# Patient Record
Sex: FEMALE | Race: WHITE | Employment: OTHER | URBAN - METROPOLITAN AREA
[De-identification: names, ages, dates, MRNs, and addresses within clinical notes are randomized per-mention and may not be internally consistent; named-entity substitution may affect disease eponyms.]

---

## 2017-06-05 ENCOUNTER — OFFICE VISIT (OUTPATIENT)
Dept: PHYSICAL MEDICINE AND REHAB | Facility: CLINIC | Age: 62
End: 2017-06-05

## 2017-06-05 VITALS
HEART RATE: 71 BPM | SYSTOLIC BLOOD PRESSURE: 138 MMHG | WEIGHT: 121.3 LBS | HEIGHT: 64 IN | DIASTOLIC BLOOD PRESSURE: 79 MMHG | BODY MASS INDEX: 20.71 KG/M2

## 2017-06-05 DIAGNOSIS — I63.89 CEREBROVASCULAR ACCIDENT (CVA) DUE TO OTHER MECHANISM (H): Primary | ICD-10-CM

## 2017-06-05 RX ORDER — BACLOFEN 10 MG/1
5 TABLET ORAL 3 TIMES DAILY
COMMUNITY
Start: 2017-05-04 | End: 2017-06-05

## 2017-06-05 RX ORDER — ATORVASTATIN CALCIUM 40 MG/1
40 TABLET, FILM COATED ORAL DAILY
COMMUNITY
Start: 2017-05-28

## 2017-06-05 RX ORDER — CLOPIDOGREL BISULFATE 75 MG/1
75 TABLET ORAL EVERY MORNING
COMMUNITY
Start: 2017-05-04

## 2017-06-05 RX ORDER — ACETAMINOPHEN 325 MG/1
650 TABLET ORAL EVERY 6 HOURS PRN
COMMUNITY
Start: 2017-01-13

## 2017-06-05 ASSESSMENT — PAIN SCALES - GENERAL: PAINLEVEL: NO PAIN (0)

## 2017-06-05 NOTE — MR AVS SNAPSHOT
After Visit Summary   6/5/2017    Karyn Ortiz    MRN: [id number]           Patient Information     Date Of Birth          1955        Visit Information        Provider Department      6/5/2017 8:30 AM Beck Villalobos PT M [facility]        Today's Diagnoses     Cerebrovascular accident (CVA) due to other mechanism (H)    -  1       Follow-ups after your visit        Your next 10 appointments already scheduled     Jun 07, 2017  8:30 AM CDT   (Arrive by 8:15 AM)   Return Visit with CIELO Hoyt [facility] ([facility])    [address] [address]   [phone]            Jun 08, 2017  8:30 AM CDT   (Arrive by 8:15 AM)   Return Visit with CIELO Hoyt [facility] ([facility])    [address] [address]   [phone]            Jun 09, 2017  8:30 AM CDT   (Arrive by 8:15 AM)   Return Visit with CIELO Hoyt [facility] ([facility])    [address] [place] [address]   [phone]            Jun 12, 2017  8:30 AM CDT   (Arrive by 8:15 AM)   Return Visit with CIELO Hoyt [facility] ([facility])    [address] [address]   [phone]            Jun 13, 2017  8:30 AM CDT   (Arrive by 8:15 AM)   Return Visit with CIELO Hoyt [facility] ([facility])    [address] [address]   [phone]            Jun 14, 2017  8:30 AM CDT   (Arrive by 8:15 AM)   [facility] ([facility])    [address] [address]   [phone]            Reagan 15, 2017  8:30 AM CDT   (Arrive by 8:15 AM)   Return Visit with Beck Villalobos PT   [facility] ([facility])    [address] [place] [id number] [phone]            2017  8:30 AM CDT   (Arrive by 8:15 AM)   Return Visit with CIELO Hoyt [facility] ([facility])    [address] [place] [id number] [phone]              Who to contact     Please call your clinic at [phone] to:    Ask questions about your health    Make or cancel appointments    Discuss your medicines    Learn about your test results    Speak to your doctor   If you have compliments or concerns about an experience at your clinic, or if you wish to file a complaint, please contact [facility] Patient Relations at [phone] or email us at [email or web address]         Additional Information About Your Visit        Physician Practice Revenue Solutionshart Information     CloudPassaget is an electronic gateway that provides easy, online access to your medical records. With Spock, you can request a clinic appointment, read your test results, renew a prescription or communicate with your care team.     To sign up for CloudPassaget visit the website at [email or web address]   You will be asked to enter the access code listed below, as well as some personal information. Please follow the directions to create your username and password.     Your access code is: [email or web address]  Expires: 2017  4:13 PM     Your access code will  in 90 days. If you need help or a new code, please contact your [facility] or call [phone] for assistance.        Care EveryWhere ID     This is your Care EveryWhere ID. This could be used by  "other organizations to access your [place] medical records  [id number]        Your Vitals Were     Pulse Height BMI (Body Mass Index)             71 1.626 m (5' 4\") 20.82 kg/m2          Blood Pressure from Last 3 Encounters:   06/06/17 127/75   06/05/17 138/79    Weight from Last 3 Encounters:   06/06/17 55 kg (121 lb 4.1 oz)   06/05/17 55 kg (121 lb 4.8 oz)              Today, you had the following     No orders found for display       Primary Care Provider Office Phone # Fax #    Gustavo Jason Gallo [phone]       [address]  [address]  [facility] [address]        Thank you!     Thank you for choosing [facility] PHYSICAL MEDICINE AND REHABILITATION  for your care. Our goal is always to provide you with excellent care. Hearing back from our patients is one way we can continue to improve our services. Please take a few minutes to complete the written survey that you may receive in the mail after your visit with us. Thank you!             Your Updated Medication List - Protect others around you: Learn how to safely use, store and throw away your medicines at www.disposemymeds.org.          This list is accurate as of: 6/5/17 11:59 PM.  Always use your most recent med list.                   Brand Name Dispense Instructions for use    acetaminophen 325 MG tablet    TYLENOL     Take 650 mg by mouth every 6 hours as needed       albuterol 108 (90 BASE) MCG/ACT Inhaler    PROAIR HFA/PROVENTIL HFA/VENTOLIN HFA         atorvastatin 40 MG tablet    LIPITOR     Take 40 mg by mouth daily       clopidogrel 75 MG tablet    PLAVIX     Take 75 mg by mouth every morning         "

## 2017-06-05 NOTE — LETTER
6/5/2017       RE: Karyn Ortiz  [address] [facility] [address]     Dear Colleague,    Thank you for referring your patient, Karyn Ortiz, to the [facility] PHYSICAL MEDICINE AND REHABILITATION at [facility]. Please see a copy of my visit note below.    61 year old female incurred MCA stroke in Dec of 2016.  Now left hemiparetic.  Good active finger motion on left.  Box and block score = 3 on left hand and 48 on right. Grasped and released 5 blocks on left in 25 s separate from BB test.   BDI = 5. No ipsilesional MEP at right M1.  RMT at left M1 = 58.   Treatment intensity = 52 at contralesional M1.  Received 10 minutes of 6 Hz priming followed by 10 minutes of 1 Hz principal rTMS continuous to contralesional M1.  Then hand exercises with grasp and release of small objects plus virtual reality ex.  Tolerated well.    Again, thank you for allowing me to participate in the care of your patient.      Sincerely,    Beck Villalobos, PT

## 2017-06-06 ENCOUNTER — OFFICE VISIT (OUTPATIENT)
Dept: PHYSICAL MEDICINE AND REHAB | Facility: CLINIC | Age: 62
End: 2017-06-06

## 2017-06-06 VITALS
WEIGHT: 121.25 LBS | HEIGHT: 64 IN | DIASTOLIC BLOOD PRESSURE: 75 MMHG | HEART RATE: 68 BPM | BODY MASS INDEX: 20.7 KG/M2 | SYSTOLIC BLOOD PRESSURE: 127 MMHG

## 2017-06-06 DIAGNOSIS — I63.89 CEREBROVASCULAR ACCIDENT (CVA) DUE TO OTHER MECHANISM (H): Primary | ICD-10-CM

## 2017-06-06 ASSESSMENT — PAIN SCALES - GENERAL: PAINLEVEL: NO PAIN (0)

## 2017-06-06 NOTE — MR AVS SNAPSHOT
After Visit Summary   6/6/2017    Karyn Ortiz    MRN: [id number]           Patient Information     Date Of Birth          1955        Visit Information        Provider Department      6/6/2017 8:30 AM Beck Villalobos PT M [facility]        Today's Diagnoses     Cerebrovascular accident (CVA) due to other mechanism (H)    -  1       Follow-ups after your visit        Your next 10 appointments already scheduled     Jun 07, 2017  8:30 AM CDT   (Arrive by 8:15 AM)   Return Visit with CIELO Hoyt [facility] ([facility])    [address] [address]   [phone]            Jun 08, 2017  8:30 AM CDT   (Arrive by 8:15 AM)   Return Visit with CIELO Hoyt [facility] ([facility])    [address] [place] [address]   [phone]            Jun 09, 2017  8:30 AM CDT   (Arrive by 8:15 AM)   Return Visit with CIELO Hoyt [facility] ([facility])    [address] [place] [address]   [phone]            Jun 12, 2017  8:30 AM CDT   (Arrive by 8:15 AM)   Return Visit with CIELO Hoyt [facility] ([facility])    [address] [address]   [phone]            Jun 13, 2017  8:30 AM CDT   (Arrive by 8:15 AM)   Return Visit with CIELO Hoyt [facility] ([facility])    [address] [address]   [phone]            Jun 14, 2017  8:30 AM CDT   (Arrive by 8:15 AM)   [facility] ([facility])    [address] [address]   [phone]            Reagan 15, 2017  8:30 AM CDT   (Arrive by 8:15 AM)   Return Visit with Beck Villalobos PT   [facility] Rehabilitation ([facility])    [address] [place] [address] [phone]            2017  8:30 AM CDT   (Arrive by 8:15 AM)   Return Visit with CIELO Hoyt [facility] Rehabilitation ([facility])    [address] [place] [id number] [phone]              Who to contact     Please call your clinic at [phone] to:    Ask questions about your health    Make or cancel appointments    Discuss your medicines    Learn about your test results    Speak to your doctor   If you have compliments or concerns about an experience at your clinic, or if you wish to file a complaint, please contact [facility] Patient Relations at [phone] or email us at [email or web address]         Additional Information About Your Visit        TheWraphart Information     Librelato Implementos RodoviÃ¡riost is an electronic gateway that provides easy, online access to your medical records. With Motally, you can request a clinic appointment, read your test results, renew a prescription or communicate with your care team.     To sign up for Librelato Implementos RodoviÃ¡riost visit the website at [email or web address]   You will be asked to enter the access code listed below, as well as some personal information. Please follow the directions to create your username and password.     Your access code is: [email or web address]  Expires: 2017  4:13 PM     Your access code will  in 90 days. If you need help or a new code, please contact your [facility] or call [phone] for assistance.        Care EveryWhere ID     This is your Care EveryWhere ID. This could be used by  "other organizations to access your [place] medical records  [id number]        Your Vitals Were     Pulse Height BMI (Body Mass Index)             68 1.626 m (5' 4\") 20.81 kg/m2          Blood Pressure from Last 3 Encounters:   06/06/17 127/75   06/05/17 138/79    Weight from Last 3 Encounters:   06/06/17 55 kg (121 lb 4.1 oz)   06/05/17 55 kg (121 lb 4.8 oz)              Today, you had the following     No orders found for display       Primary Care Provider Office Phone # Fax #    Gustavo Jason Gallo [phone]       [address]  [address]  [facility] [address]        Thank you!     Thank you for choosing [facility] PHYSICAL MEDICINE AND REHABILITATION  for your care. Our goal is always to provide you with excellent care. Hearing back from our patients is one way we can continue to improve our services. Please take a few minutes to complete the written survey that you may receive in the mail after your visit with us. Thank you!             Your Updated Medication List - Protect others around you: Learn how to safely use, store and throw away your medicines at www.disposemymeds.org.          This list is accurate as of: 6/6/17  1:24 PM.  Always use your most recent med list.                   Brand Name Dispense Instructions for use    acetaminophen 325 MG tablet    TYLENOL     Take 650 mg by mouth every 6 hours as needed       albuterol 108 (90 BASE) MCG/ACT Inhaler    PROAIR HFA/PROVENTIL HFA/VENTOLIN HFA         atorvastatin 40 MG tablet    LIPITOR     Take 40 mg by mouth daily       clopidogrel 75 MG tablet    PLAVIX     Take 75 mg by mouth every morning         "

## 2017-06-06 NOTE — PROGRESS NOTES
61 year old female incurred MCA stroke in Dec of 2016.  Now left hemiparetic.  Good active finger motion on left.  Box and block score = 3 on left hand and 48 on right. Grasped and released 5 blocks on left in 25 s separate from BB test.   BDI = 5. No ipsilesional MEP at right M1.  RMT at left M1 = 58.   Treatment intensity = 52 at contralesional M1.  Received 10 minutes of 6 Hz priming followed by 10 minutes of 1 Hz principal rTMS continuous to contralesional M1.  Then hand exercises with grasp and release of small objects plus virtual reality ex.  Tolerated well.

## 2017-06-06 NOTE — PROGRESS NOTES
No adverse events.  RMT at contralesional M1 = 53, Treatment intensity 48.  10 minutes of ^ Hz priming then 10 min of 1 Hz principal.  Then hand ex.

## 2017-06-06 NOTE — LETTER
6/6/2017       RE: Karyn Ortiz  [address] [facility] [address]     Dear Colleague,    Thank you for referring your patient, Karyn Ortiz, to the [facility] PHYSICAL MEDICINE AND REHABILITATION at [facility]. Please see a copy of my visit note below.    No adverse events.  RMT at contralesional M1 = 53, Treatment intensity 48.  10 minutes of ^ Hz priming then 10 min of 1 Hz principal.  Then hand ex.    Again, thank you for allowing me to participate in the care of your patient.      Sincerely,    Beck Villalobos, PT

## 2017-06-07 ENCOUNTER — OFFICE VISIT (OUTPATIENT)
Dept: PHYSICAL MEDICINE AND REHAB | Facility: CLINIC | Age: 62
End: 2017-06-07

## 2017-06-07 VITALS
HEART RATE: 74 BPM | SYSTOLIC BLOOD PRESSURE: 129 MMHG | HEIGHT: 64 IN | DIASTOLIC BLOOD PRESSURE: 78 MMHG | WEIGHT: 121.25 LBS | BODY MASS INDEX: 20.7 KG/M2

## 2017-06-07 DIAGNOSIS — I63.89 CEREBROVASCULAR ACCIDENT (CVA) DUE TO OTHER MECHANISM (H): Primary | ICD-10-CM

## 2017-06-07 ASSESSMENT — PAIN SCALES - GENERAL: PAINLEVEL: NO PAIN (0)

## 2017-06-07 NOTE — LETTER
6/7/2017       RE: Karyn Ortiz  [address] [facility] [address]     Dear Colleague,    Thank you for referring your patient, Karyn Ortiz, to the [facility] PHYSICAL MEDICINE AND REHABILITATION at [facility]. Please see a copy of my visit note below.    No adverse events.   RMT =50 at contralesional M1.  Treatment intenstiy = 45.  10 minutes of 6 Hz priming, then 10 minutes of 1 Hz principal.  Then virtual reality hand ex, which patient was motivated by.  Then physical tasks with hand.  Improvement noted in ability to raise a bottle of water to mouth and drink with left hand.  Still has much difficulty pinching small objects because of gross activation of all fingers and little thumb opposition.      Again, thank you for allowing me to participate in the care of your patient.      Sincerely,    Beck Villalobos, PT

## 2017-06-07 NOTE — PROGRESS NOTES
No adverse events.   RMT =50 at contralesional M1.  Treatment intenstiy = 45.  10 minutes of 6 Hz priming, then 10 minutes of 1 Hz principal.  Then virtual reality hand ex, which patient was motivated by.  Then physical tasks with hand.  Improvement noted in ability to raise a bottle of water to mouth and drink with left hand.  Still has much difficulty pinching small objects because of gross activation of all fingers and little thumb opposition.

## 2017-06-07 NOTE — MR AVS SNAPSHOT
After Visit Summary   6/7/2017    Karyn Ortiz    MRN: [id number]           Patient Information     Date Of Birth          1955        Visit Information        Provider Department      6/7/2017 8:30 AM Beck Villalobos PT M [facility]        Today's Diagnoses     Cerebrovascular accident (CVA) due to other mechanism (H)    -  1       Follow-ups after your visit        Your next 10 appointments already scheduled     Jun 08, 2017  8:30 AM CDT   (Arrive by 8:15 AM)   Return Visit with CIELO Hoyt [facility] ([facility])    [address] [address]   [phone]            Jun 09, 2017  8:30 AM CDT   (Arrive by 8:15 AM)   Return Visit with CIELO Hoyt [facility] ([facility])    [address] [address]   [phone]            Jun 12, 2017  8:30 AM CDT   (Arrive by 8:15 AM)   Return Visit with CIELO Hoyt [facility] ([facility])    [address] [address]   [phone]            Jun 13, 2017  8:30 AM CDT   (Arrive by 8:15 AM)   Return Visit with CIELO Hoyt [facility] ([facility])    [address] [address]   [phone]            Jun 14, 2017  8:30 AM CDT   (Arrive by 8:15 AM)   Return Visit with CIELO Hoyt [facility] ([facility])    [address] [address]   [phone]            Reagan 15, 2017  8:30 AM CDT   (Arrive by 8:15 AM)   [facility] ([facility])    [address] [facility]  [address] [facility] [address] [phone]            2017  8:30 AM CDT   (Arrive by 8:15 AM)   Return Visit with Beck Villalobos PT   [facility] Physical Medicine and Rehabilitation ([facility])    [address] [phone] [phone]              Who to contact     Please call your clinic at [phone] to:    Ask questions about your health    Make or cancel appointments    Discuss your medicines    Learn about your test results    Speak to your doctor   If you have compliments or concerns about an experience at your clinic, or if you wish to file a complaint, please contact [facility] Patient Relations at [phone] or email us at [email or web address]         Additional Information About Your Visit        MyChart Information     We R Interactivet is an electronic gateway that provides easy, online access to your medical records. With Basho Technologies, you can request a clinic appointment, read your test results, renew a prescription or communicate with your care team.     To sign up for Basho Technologies visit the website at [email or web address]   You will be asked to enter the access code listed below, as well as some personal information. Please follow the directions to create your username and password.     Your access code is: [email or web address]  Expires: 2017  4:13 PM     Your access code will  in 90 days. If you need help or a new code, please contact your [facility] or call [phone] for assistance.        Care EveryWhere ID     This is your Care EveryWhere ID. This could be used by other organizations to access your [place] medical records  [id number]        Your Vitals Were     Pulse Height BMI (Body Mass Index)             74 1.626 m (5' 4\") 20.81 kg/m2          Blood Pressure from Last 3 Encounters:   17 129/78   17 127/75 "   06/05/17 138/79    Weight from Last 3 Encounters:   06/07/17 55 kg (121 lb 4.1 oz)   06/06/17 55 kg (121 lb 4.1 oz)   06/05/17 55 kg (121 lb 4.8 oz)              Today, you had the following     No orders found for display       Primary Care Provider Office Phone # Fax #    Gustavo Gallo [phone]       [address] [place] [address]        Thank you!     Thank you for choosing [facility] PHYSICAL MEDICINE AND REHABILITATION  for your care. Our goal is always to provide you with excellent care. Hearing back from our patients is one way we can continue to improve our services. Please take a few minutes to complete the written survey that you may receive in the mail after your visit with us. Thank you!             Your Updated Medication List - Protect others around you: Learn how to safely use, store and throw away your medicines at www.disposemymeds.org.          This list is accurate as of: 6/7/17  1:19 PM.  Always use your most recent med list.                   Brand Name Dispense Instructions for use    acetaminophen 325 MG tablet    TYLENOL     Take 650 mg by mouth every 6 hours as needed       albuterol 108 (90 BASE) MCG/ACT Inhaler    PROAIR HFA/PROVENTIL HFA/VENTOLIN HFA         atorvastatin 40 MG tablet    LIPITOR     Take 40 mg by mouth daily       clopidogrel 75 MG tablet    PLAVIX     Take 75 mg by mouth every morning

## 2017-06-08 ENCOUNTER — OFFICE VISIT (OUTPATIENT)
Dept: PHYSICAL MEDICINE AND REHAB | Facility: CLINIC | Age: 62
End: 2017-06-08

## 2017-06-08 VITALS
HEIGHT: 64 IN | BODY MASS INDEX: 20.7 KG/M2 | DIASTOLIC BLOOD PRESSURE: 73 MMHG | SYSTOLIC BLOOD PRESSURE: 126 MMHG | RESPIRATION RATE: 18 BRPM | WEIGHT: 121.25 LBS | HEART RATE: 76 BPM

## 2017-06-08 DIAGNOSIS — I63.89 CEREBROVASCULAR ACCIDENT (CVA) DUE TO OTHER MECHANISM (H): Primary | ICD-10-CM

## 2017-06-08 ASSESSMENT — PAIN SCALES - GENERAL: PAINLEVEL: NO PAIN (0)

## 2017-06-08 NOTE — PROGRESS NOTES
No adverse events. RMT at contralesional M1=55.  Treatment intensity=50 to contalesional M1.  Received 10 minutes of priming then 10 minutes of principal rTMS.  Followed by hand ex.   Tried tracking ex but this was difficult at first.  Would be good to try in a few days.  Greatest functional problem is that she cannot flex and extend her fingers individually.  Rather, it is a mass flexion and extension, which is fine for large objects the size of a potato but very difficult for objects the size of a grape.  Botox should be considered to hopefully reduce this mass flexion but leave enough neuromuscular action intact to allow less forceful individual finger movements.

## 2017-06-08 NOTE — MR AVS SNAPSHOT
After Visit Summary   6/8/2017    Karyn Ortiz    MRN: [id number]           Patient Information     Date Of Birth          1955        Visit Information        Provider Department      6/8/2017 8:30 AM Beck Villalobso PT M [facility]        Today's Diagnoses     Cerebrovascular accident (CVA) due to other mechanism (H)    -  1       Follow-ups after your visit        Your next 10 appointments already scheduled     Jun 09, 2017  8:30 AM CDT   (Arrive by 8:15 AM)   Return Visit with CIELO Hoyt [facility] ([facility])    [address] [address]   [phone]            Jun 12, 2017  8:30 AM CDT   (Arrive by 8:15 AM)   Return Visit with CIELO Hoyt [facility] ([facility])    [address] [address]   [phone]            Jun 13, 2017  8:30 AM CDT   (Arrive by 8:15 AM)   Return Visit with CIELO Hoyt [facility] ([facility])    [address] [address]   [phone]            Jun 14, 2017  8:30 AM CDT   (Arrive by 8:15 AM)   Return Visit with ICELO Hoyt [facility] ([facility])    [address] [address]   [phone]            Reagan 15, 2017  8:30 AM CDT   (Arrive by 8:15 AM)   Return Visit with CIELO Hoyt [facility] ([facility])    [address] [address]   [phone]            Jun 16, 2017  8:30 AM CDT   (Arrive by 8:15 AM)   [facility]  [facility])    [address] [address] [phone]              Who to contact     Please call your clinic at [phone] to:    Ask questions about your health    Make or cancel appointments    Discuss your medicines    Learn about your test results    Speak to your doctor   If you have compliments or concerns about an experience at your clinic, or if you wish to file a complaint, please contact [facility] Patient Relations at [phone] or email us at [email or web address]         Additional Information About Your Visit        Simmr Information     Simmr is an electronic gateway that provides easy, online access to your medical records. With Simmr, you can request a clinic appointment, read your test results, renew a prescription or communicate with your care team.     To sign up for Simmr visit the website at [email or web address]   You will be asked to enter the access code listed below, as well as some personal information. Please follow the directions to create your username and password.     Your access code is: [email or web address]  Expires: 2017  4:13 PM     Your access code will  in 90 days. If you need help or a new code, please contact your [facility] or call [phone] for assistance.        Care EveryWhere ID     This is your Care EveryWhere ID. This could be used by other organizations to access your [place] medical records  [id number]        Your Vitals Were     Pulse Respirations Height BMI (Body Mass Index)          76 18 1.626 m (5' 4\") 20.81 kg/m2         Blood Pressure from Last 3 Encounters:   17 126/73   17 129/78   17 127/75    Weight from Last 3 Encounters:   17 55 kg (121 lb 4.1 oz)   17 55 kg (121 lb 4.1 oz)   17 55 kg (121 lb 4.1 oz)              Today, you had the following     No orders found for display       Primary Care " Provider Office Phone # Fax #    Gustavo Gallo [phone]       [place] [address] [place] [address]        Thank you!     Thank you for choosing [facility] PHYSICAL MEDICINE AND REHABILITATION  for your care. Our goal is always to provide you with excellent care. Hearing back from our patients is one way we can continue to improve our services. Please take a few minutes to complete the written survey that you may receive in the mail after your visit with us. Thank you!             Your Updated Medication List - Protect others around you: Learn how to safely use, store and throw away your medicines at www.disposemymeds.org.          This list is accurate as of: 6/8/17 11:42 AM.  Always use your most recent med list.                   Brand Name Dispense Instructions for use    acetaminophen 325 MG tablet    TYLENOL     Take 650 mg by mouth every 6 hours as needed       albuterol 108 (90 BASE) MCG/ACT Inhaler    PROAIR HFA/PROVENTIL HFA/VENTOLIN HFA         atorvastatin 40 MG tablet    LIPITOR     Take 40 mg by mouth daily       clopidogrel 75 MG tablet    PLAVIX     Take 75 mg by mouth every morning

## 2017-06-08 NOTE — LETTER
6/8/2017       RE: Karyn Ortiz  [address] [facility] [address]     Dear Colleague,    Thank you for referring your patient, Karyn Ortiz, to the [facility]ICAL MEDICINE AND REHABILITATION at [facility]. Please see a copy of my visit note below.    No adverse events. RMT at contralesional M1=55.  Treatment intensity=50 to contalesional M1.  Received 10 minutes of priming then 10 minutes of principal rTMS.  Followed by hand ex.   Tried tracking ex but this was difficult at first.  Would be good to try in a few days.  Greatest functional problem is that she cannot flex and extend her fingers individually.  Rather, it is a mass flexion and extension, which is fine for large objects the size of a potato but very difficult for objects the size of a grape.  Botox should be considered to hopefully reduce this mass flexion but leave enough neuromuscular action intact to allow less forceful individual finger movements.    Again, thank you for allowing me to participate in the care of your patient.      Sincerely,    Beck Villalobos, PT

## 2017-06-09 ENCOUNTER — OFFICE VISIT (OUTPATIENT)
Dept: PHYSICAL MEDICINE AND REHAB | Facility: CLINIC | Age: 62
End: 2017-06-09

## 2017-06-09 VITALS
HEART RATE: 68 BPM | HEIGHT: 64 IN | SYSTOLIC BLOOD PRESSURE: 126 MMHG | WEIGHT: 121.25 LBS | BODY MASS INDEX: 20.7 KG/M2 | DIASTOLIC BLOOD PRESSURE: 71 MMHG

## 2017-06-09 DIAGNOSIS — I63.89 CEREBROVASCULAR ACCIDENT (CVA) DUE TO OTHER MECHANISM (H): Primary | ICD-10-CM

## 2017-06-09 ASSESSMENT — PAIN SCALES - GENERAL: PAINLEVEL: NO PAIN (0)

## 2017-06-09 NOTE — PROGRESS NOTES
No adverse events.  RMT for contralesional M1=50, treatment intensity kept at 50.  10 priming rTMS, 10 minutes principal rTMS.  Hand exercises.  Noticed improved drinking from water bottle and release of water bottle.  Pinch and release of small objects improving slowly.  Individual control of fingers still difficult.

## 2017-06-09 NOTE — MR AVS SNAPSHOT
After Visit Summary   6/9/2017    Karyn Ortiz    MRN: [id number]           Patient Information     Date Of Birth          1955        Visit Information        Provider Department      6/9/2017 8:30 AM Beck Villalobos PT M [facility]        Today's Diagnoses     Cerebrovascular accident (CVA) due to other mechanism (H)    -  1       Follow-ups after your visit        Your next 10 appointments already scheduled     Jun 12, 2017  8:30 AM CDT   (Arrive by 8:15 AM)   Return Visit with CIELO Hoyt [facility] ([facility])    [address] [address] [phone]            Jun 13, 2017  8:30 AM CDT   (Arrive by 8:15 AM)   Return Visit with CIELO Hoyt [facility] ([facility])    [address] [address] [phone]            Jun 14, 2017  8:30 AM CDT   (Arrive by 8:15 AM)   Return Visit with CIELO Hoyt [facility] ([facility])    [address] [address] [phone]            Reagan 15, 2017  8:30 AM CDT   (Arrive by 8:15 AM)   Return Visit with CIELO Hoyt [facility] ([facility])    [address] [address] [phone]            Jun 16, 2017  8:30 AM CDT   (Arrive by 8:15 AM)   Return Visit with CIELO Hoyt [facility] ([facility])    [address] [address] [phone]              Who to contact     Please call your clinic at [phone] to:    Ask questions about your health    Make or cancel appointments    Discuss your medicines    Learn  "about your test results    Speak to your doctor   If you have compliments or concerns about an experience at your clinic, or if you wish to file a complaint, please contact [facility] Patient Relations at [phone] or email us at [email or web address]         Additional Information About Your Visit        [email or web address] Information     Genwords is an electronic gateway that provides easy, online access to your medical records. With Genwords, you can request a clinic appointment, read your test results, renew a prescription or communicate with your care team.     To sign up for Genwords visit the website at [email or web address]   You will be asked to enter the access code listed below, as well as some personal information. Please follow the directions to create your username and password.     Your access code is: [email or web address]  Expires: 2017  4:13 PM     Your access code will  in 90 days. If you need help or a new code, please contact your [facility] or call [phone] for assistance.        Care EveryWhere ID     This is your Care EveryWhere ID. This could be used by other organizations to access your [place] medical records  [id number]        Your Vitals Were     Pulse Height BMI (Body Mass Index)             68 1.626 m (5' 4\") 20.81 kg/m2          Blood Pressure from Last 3 Encounters:   17 126/71   17 126/73   17 129/78    Weight from Last 3 Encounters:   17 55 kg (121 lb 4.1 oz)   17 55 kg (121 lb 4.1 oz)   17 55 kg (121 lb 4.1 oz)              Today, you had the following     No orders found for display       Primary Care Provider Office Phone # Fax #    Gustavo Gallo [phone]       [address] [place] [address]        Thank you!     Thank you for choosing [facility] PHYSICAL MEDICINE AND REHABILITATION  for your care. Our goal is always " to provide you with excellent care. Hearing back from our patients is one way we can continue to improve our services. Please take a few minutes to complete the written survey that you may receive in the mail after your visit with us. Thank you!             Your Updated Medication List - Protect others around you: Learn how to safely use, store and throw away your medicines at www.disposemymeds.org.          This list is accurate as of: 6/9/17 11:45 AM.  Always use your most recent med list.                   Brand Name Dispense Instructions for use    acetaminophen 325 MG tablet    TYLENOL     Take 650 mg by mouth every 6 hours as needed       albuterol 108 (90 BASE) MCG/ACT Inhaler    PROAIR HFA/PROVENTIL HFA/VENTOLIN HFA         atorvastatin 40 MG tablet    LIPITOR     Take 40 mg by mouth daily       clopidogrel 75 MG tablet    PLAVIX     Take 75 mg by mouth every morning

## 2017-06-09 NOTE — LETTER
6/9/2017       RE: Karyn Ortiz  [address] [facility] [address]     Dear Colleague,    Thank you for referring your patient, Karyn Ortiz, to the [facility] PHYSICAL MEDICINE AND REHABILITATION at [facility]. Please see a copy of my visit note below.    No adverse events.  RMT for contralesional M1=50, treatment intensity kept at 50.  10 priming rTMS, 10 minutes principal rTMS.  Hand exercises.  Noticed improved drinking from water bottle and release of water bottle.  Pinch and release of small objects improving slowly.  Individual control of fingers still difficult.    Again, thank you for allowing me to participate in the care of your patient.      Sincerely,    Beck Villalobos, PT

## 2017-06-12 ENCOUNTER — OFFICE VISIT (OUTPATIENT)
Dept: PHYSICAL MEDICINE AND REHAB | Facility: CLINIC | Age: 62
End: 2017-06-12

## 2017-06-12 VITALS
SYSTOLIC BLOOD PRESSURE: 133 MMHG | DIASTOLIC BLOOD PRESSURE: 69 MMHG | HEART RATE: 68 BPM | HEIGHT: 64 IN | WEIGHT: 121.25 LBS | BODY MASS INDEX: 20.7 KG/M2

## 2017-06-12 DIAGNOSIS — I63.89 CEREBROVASCULAR ACCIDENT (CVA) DUE TO OTHER MECHANISM (H): Primary | ICD-10-CM

## 2017-06-12 ASSESSMENT — PAIN SCALES - GENERAL: PAINLEVEL: NO PAIN (0)

## 2017-06-12 NOTE — PROGRESS NOTES
No adverse events.  Explored whether an ipsilesional MEP could be elicited today.  Indeed, she did now demonstrate such a response, whereas she did not at baseline.  RMT was 90.  Good sign.  Continued with ipsilesional rTMS.  RMT for contralesional M1 = 55.  Treatment intensity =50.  Followed by variety of finger exercises and electrical stimulation to thenar eminence to promote more thumb opposition.

## 2017-06-12 NOTE — MR AVS SNAPSHOT
After Visit Summary   6/12/2017    Karyn Ortiz    MRN: [id number]           Patient Information     Date Of Birth          1955        Visit Information        Provider Department      6/12/2017 8:30 AM Beck Villalobos PT M [facility]        Today's Diagnoses     Cerebrovascular accident (CVA) due to other mechanism (H)    -  1       Follow-ups after your visit        Your next 10 appointments already scheduled     Jun 13, 2017  8:30 AM CDT   (Arrive by 8:15 AM)   Return Visit with CIELO Hoyt [facility] ([facility])    [address] [place] [address] [phone]            Jun 14, 2017  8:30 AM CDT   (Arrive by 8:15 AM)   Return Visit with CIELO Hoyt [facility] ([facility])    [address] [place] [id number] [phone]            Reagan 15, 2017  8:30 AM CDT   (Arrive by 8:15 AM)   Return Visit with CIELO Hoyt [facility] ([facility])    [address] [place] [id number] [phone]            Jun 16, 2017  8:30 AM CDT   (Arrive by 8:15 AM)   Return Visit with CIELO Hoyt [facility] ([facility])    [address] [place] [address] [phone]              Who to contact     Please call your clinic at [phone] to:    Ask questions about your health    Make or cancel appointments    Discuss your medicines    Learn about your test results    Speak to your doctor   If you have compliments or concerns about an experience at your clinic, or if you wish to file a complaint, please contact [facility] Patient Relations at [phone] or email us at  "[email or web address]         Additional Information About Your Visit        inFreeDAharPipewise Information     MyLife is an electronic gateway that provides easy, online access to your medical records. With MyLife, you can request a clinic appointment, read your test results, renew a prescription or communicate with your care team.     To sign up for MyLife visit the website at [email or web address] Fund [email or web address]   You will be asked to enter the access code listed below, as well as some personal information. Please follow the directions to create your username and password.     Your access code is: [email or web address]  Expires: 2017  4:13 PM     Your access code will  in 90 days. If you need help or a new code, please contact your [facility] or call [phone] for assistance.        Care EveryWhere ID     This is your Care EveryWhere ID. This could be used by other organizations to access your [place] medical records  [id number]        Your Vitals Were     Pulse Height BMI (Body Mass Index)             68 1.626 m (5' 4\") 20.81 kg/m2          Blood Pressure from Last 3 Encounters:   17 133/69   17 126/71   17 126/73    Weight from Last 3 Encounters:   17 55 kg (121 lb 4.1 oz)   17 55 kg (121 lb 4.1 oz)   17 55 kg (121 lb 4.1 oz)              Today, you had the following     No orders found for display       Primary Care Provider Office Phone # Fax #    Gustavo Gallo [phone]       [address]  [employer] [address]        Thank you!     Thank you for choosing [facility] PHYSICAL MEDICINE AND REHABILITATION  for your care. Our goal is always to provide you with excellent care. Hearing back from our patients is one way we can continue to improve our services. Please take a few minutes to complete the written survey that you may receive in the mail after your visit with us. Thank you!           "   Your Updated Medication List - Protect others around you: Learn how to safely use, store and throw away your medicines at www.disposemymeds.org.          This list is accurate as of: 6/12/17  6:07 PM.  Always use your most recent med list.                   Brand Name Dispense Instructions for use    acetaminophen 325 MG tablet    TYLENOL     Take 650 mg by mouth every 6 hours as needed       albuterol 108 (90 BASE) MCG/ACT Inhaler    PROAIR HFA/PROVENTIL HFA/VENTOLIN HFA         atorvastatin 40 MG tablet    LIPITOR     Take 40 mg by mouth daily       clopidogrel 75 MG tablet    PLAVIX     Take 75 mg by mouth every morning

## 2017-06-12 NOTE — LETTER
6/12/2017       RE: Karyn Ortiz  [address] [facility] [address]     Dear Colleague,    Thank you for referring your patient, Karyn Ortiz, to the [facility] PHYSICAL MEDICINE AND REHABILITATION at [facility]. Please see a copy of my visit note below.    No adverse events.  Explored whether an ipsilesional MEP could be elicited today.  Indeed, she did now demonstrate such a response, whereas she did not at baseline.  RMT was 90.  Good sign.  Continued with ipsilesional rTMS.  RMT for contralesional M1 = 55.  Treatment intensity =50.  Followed by variety of finger exercises and electrical stimulation to thenar eminence to promote more thumb opposition.    Again, thank you for allowing me to participate in the care of your patient.      Sincerely,    Beck Villalobos, PT

## 2017-06-13 ENCOUNTER — OFFICE VISIT (OUTPATIENT)
Dept: PHYSICAL MEDICINE AND REHAB | Facility: CLINIC | Age: 62
End: 2017-06-13

## 2017-06-13 VITALS
HEIGHT: 64 IN | WEIGHT: 121.25 LBS | HEART RATE: 77 BPM | DIASTOLIC BLOOD PRESSURE: 77 MMHG | SYSTOLIC BLOOD PRESSURE: 136 MMHG | BODY MASS INDEX: 20.7 KG/M2

## 2017-06-13 DIAGNOSIS — I63.89 CEREBROVASCULAR ACCIDENT (CVA) DUE TO OTHER MECHANISM (H): Primary | ICD-10-CM

## 2017-06-13 ASSESSMENT — PAIN SCALES - GENERAL: PAINLEVEL: NO PAIN (0)

## 2017-06-13 NOTE — LETTER
6/13/2017     RE: Karyn Ortiz  [address] [facility] [address]     Dear Colleague,    Thank you for referring your patient, Karyn Ortiz, to the [facility] PHYSICAL MEDICINE AND REHABILITATION at [facility]. Please see a copy of my visit note below.    No adverse events.  Patient reports using left hand more at home, including turning lights on and off at wall switch. RMT at contralesional M1=54 today.  Treatment intensity was also 54 (i.e. 100% of RMT).  10 min of 6 Hz priming then 10 min of 1 Hz rTMS to contralesional M1.  Continued with various hand ex.  Stiffness is increasing in wrist flexors and elbow flexors and shoulder internal rotators.  Will receive Botox on July 3.    Again, thank you for allowing me to participate in the care of your patient.      Sincerely,    Beck Villalobos, PT

## 2017-06-13 NOTE — MR AVS SNAPSHOT
After Visit Summary   6/13/2017    Karyn Ortiz    MRN: [id number]           Patient Information     Date Of Birth          1955        Visit Information        Provider Department      6/13/2017 8:30 AM Beck Villalobos PT M [facility] Medicine [facility] Rehabilitation        Today's Diagnoses     Cerebrovascular accident (CVA) due to other mechanism (H)    -  1       Follow-ups after your visit        Your next 10 appointments already scheduled     Jun 14, 2017  8:30 AM CDT   (Arrive by 8:15 AM)   Return Visit with CIELO Hoyt [facility] Rehabilitation ([facility])    [address] [place] [address] [phone]            Reagan 15, 2017  8:30 AM CDT   (Arrive by 8:15 AM)   Return Visit with CIELO Hoyt [facility] ([facility])    [address] [place] [id number] [phone]            Jun 16, 2017  8:30 AM CDT   (Arrive by 8:15 AM)   Return Visit with CIELO Hoyt [facility] ([facility])    [address] [place] [address] [phone]              Who to contact     Please call your clinic at [phone] to:    Ask questions about your health    Make or cancel appointments    Discuss your medicines    Learn about your test results    Speak to your doctor   If you have compliments or concerns about an experience at your clinic, or if you wish to file a complaint, please contact [facility] Patient Relations at [phone] or email us at [email or web address]         Additional Information About Your Visit        Cirtas Systemshart Information     Mind Technologies is an electronic gateway that provides easy, online access to your medical records. With Mind Technologies, you can request a clinic appointment, read  "your test results, renew a prescription or communicate with your care team.     To sign up for Atlassianhart visit the website at [email or web address]   You will be asked to enter the access code listed below, as well as some personal information. Please follow the directions to create your username and password.     Your access code is: [email or web address]  Expires: 2017  4:13 PM     Your access code will  in 90 days. If you need help or a new code, please contact your [facility] or call [phone] for assistance.        Care EveryWhere ID     This is your Care EveryWhere ID. This could be used by other organizations to access your [place] medical records  [id number]        Your Vitals Were     Pulse Height BMI (Body Mass Index)             77 1.626 m (5' 4\") 20.81 kg/m2          Blood Pressure from Last 3 Encounters:   17 136/77   17 133/69   17 126/71    Weight from Last 3 Encounters:   17 55 kg (121 lb 4.1 oz)   17 55 kg (121 lb 4.1 oz)   17 55 kg (121 lb 4.1 oz)              Today, you had the following     No orders found for display       Primary Care Provider Office Phone # Fax #    Gustavo Gallo [phone]       [address] [place] [address]        Thank you!     Thank you for choosing [facility] PHYSICAL MEDICINE AND REHABILITATION  for your care. Our goal is always to provide you with excellent care. Hearing back from our patients is one way we can continue to improve our services. Please take a few minutes to complete the written survey that you may receive in the mail after your visit with us. Thank you!             Your Updated Medication List - Protect others around you: Learn how to safely use, store and throw away your medicines at www.disposemymeds.org.          This list is accurate as of: 17 12:14 PM.  Always use your most recent med list.                   " Brand Name Dispense Instructions for use    acetaminophen 325 MG tablet    TYLENOL     Take 650 mg by mouth every 6 hours as needed       albuterol 108 (90 BASE) MCG/ACT Inhaler    PROAIR HFA/PROVENTIL HFA/VENTOLIN HFA         atorvastatin 40 MG tablet    LIPITOR     Take 40 mg by mouth daily       clopidogrel 75 MG tablet    PLAVIX     Take 75 mg by mouth every morning

## 2017-06-13 NOTE — PROGRESS NOTES
No adverse events.  Patient reports using left hand more at home, including turning lights on and off at wall switch. RMT at contralesional M1=54 today.  Treatment intensity was also 54 (i.e. 100% of RMT).  10 min of 6 Hz priming then 10 min of 1 Hz rTMS to contralesional M1.  Continued with various hand ex.  Stiffness is increasing in wrist flexors and elbow flexors and shoulder internal rotators.  Will receive Botox on July 3.

## 2017-06-14 ENCOUNTER — OFFICE VISIT (OUTPATIENT)
Dept: PHYSICAL MEDICINE AND REHAB | Facility: CLINIC | Age: 62
End: 2017-06-14

## 2017-06-14 VITALS — HEIGHT: 64 IN | HEART RATE: 70 BPM | SYSTOLIC BLOOD PRESSURE: 137 MMHG | DIASTOLIC BLOOD PRESSURE: 77 MMHG

## 2017-06-14 DIAGNOSIS — I63.89 CEREBROVASCULAR ACCIDENT (CVA) DUE TO OTHER MECHANISM (H): Primary | ICD-10-CM

## 2017-06-14 NOTE — MR AVS SNAPSHOT
After Visit Summary   6/14/2017    Karyn Ortiz    MRN: [id number]           Patient Information     Date Of Birth          1955        Visit Information        Provider Department      6/14/2017 8:30 AM Beck Villalobos PT [facility] Physical Medicine and Rehabilitation        Today's Diagnoses     Cerebrovascular accident (CVA) due to other mechanism (H)    -  1       Follow-ups after your visit        Your next 10 appointments already scheduled     Reagan 15, 2017  8:30 AM CDT   (Arrive by 8:15 AM)   Return Visit with CIELO Hoyt [facility] Physical Medicine and Rehabilitation ([facility])    [address] [place] [address] [id number] [phone]            Jun 16, 2017  8:30 AM CDT   (Arrive by 8:15 AM)   Return Visit with CIELO Hoyt [facility] Physical Medicine and Rehabilitation ([facility])    [address] [place] [id number] [phone]              Who to contact     Please call your clinic at [phone] to:    Ask questions about your health    Make or cancel appointments    Discuss your medicines    Learn about your test results    Speak to your doctor   If you have compliments or concerns about an experience at your clinic, or if you wish to file a complaint, please contact [facility] Patient Relations at [phone] or email us at [email or web address]         Additional Information About Your Visit        MyChart Information     Return Patht is an electronic gateway that provides easy, online access to your medical records. With Keypr, you can request a clinic appointment, read your test results, renew a prescription or communicate with your care team.     To sign up for Return Patht visit the website at [email or web address]   You will be asked to enter the access code listed below, as well as some personal information. Please  "follow the directions to create your username and password.     Your access code is: [email or web address]  Expires: 2017  4:13 PM     Your access code will  in 90 days. If you need help or a new code, please contact your [facility] or call [phone] for assistance.        Care EveryWhere ID     This is your Care EveryWhere ID. This could be used by other organizations to access your [place] medical records  [id number]        Your Vitals Were     Pulse Height                70 1.626 m (5' 4\")           Blood Pressure from Last 3 Encounters:   17 137/77   17 136/77   17 133/69    Weight from Last 3 Encounters:   17 55 kg (121 lb 4.1 oz)   17 55 kg (121 lb 4.1 oz)   17 55 kg (121 lb 4.1 oz)              Today, you had the following     No orders found for display       Primary Care Provider Office Phone # Fax #    Gustavo Jason Gallo [phone]       [address]  [place] [id number]        Thank you!     Thank you for choosing [facility] PHYSICAL MEDICINE AND REHABILITATION  for your care. Our goal is always to provide you with excellent care. Hearing back from our patients is one way we can continue to improve our services. Please take a few minutes to complete the written survey that you may receive in the mail after your visit with us. Thank you!             Your Updated Medication List - Protect others around you: Learn how to safely use, store and throw away your medicines at www.disposemymeds.org.          This list is accurate as of: 17 11:52 AM.  Always use your most recent med list.                   Brand Name Dispense Instructions for use    acetaminophen 325 MG tablet    TYLENOL     Take 650 mg by mouth every 6 hours as needed       albuterol 108 (90 BASE) MCG/ACT Inhaler    PROAIR HFA/PROVENTIL HFA/VENTOLIN HFA         atorvastatin 40 MG tablet    LIPITOR     Take 40 mg by mouth " daily       clopidogrel 75 MG tablet    PLAVIX     Take 75 mg by mouth every morning

## 2017-06-14 NOTE — LETTER
6/14/2017       RE: Karyn Ortiz  [address] [facility] [address]     Dear Colleague,    Thank you for referring your patient, Karyn Ortiz, to the [facility] PHYSICAL MEDICINE AND REHABILITATION at [facility]. Please see a copy of my visit note below.    No adverse events.  Reports using left hand for feeding with spoon.  RMT at contralesional M1=53.  Treatment intensity = 53.  10 min of 6 hz priming rtms then 10 of 1 Hz principal rTMS.  Exercises continue.  Improvement noted in stacking blocks and picking up walnut pieces.    Again, thank you for allowing me to participate in the care of your patient.      Sincerely,    Beck Villalobos, PT

## 2017-06-14 NOTE — PROGRESS NOTES
No adverse events.  Reports using left hand for feeding with spoon.  RMT at contralesional M1=53.  Treatment intensity = 53.  10 min of 6 hz priming rtms then 10 of 1 Hz principal rTMS.  Exercises continue.  Improvement noted in stacking blocks and picking up walnut pieces.

## 2017-06-15 ENCOUNTER — OFFICE VISIT (OUTPATIENT)
Dept: PHYSICAL MEDICINE AND REHAB | Facility: CLINIC | Age: 62
End: 2017-06-15

## 2017-06-15 VITALS
HEART RATE: 71 BPM | BODY MASS INDEX: 20.7 KG/M2 | WEIGHT: 121.25 LBS | DIASTOLIC BLOOD PRESSURE: 76 MMHG | HEIGHT: 64 IN | SYSTOLIC BLOOD PRESSURE: 127 MMHG

## 2017-06-15 DIAGNOSIS — I63.89 CEREBROVASCULAR ACCIDENT (CVA) DUE TO OTHER MECHANISM (H): Primary | ICD-10-CM

## 2017-06-15 ASSESSMENT — PAIN SCALES - GENERAL: PAINLEVEL: NO PAIN (0)

## 2017-06-15 NOTE — LETTER
6/15/2017       RE: Karyn Ortiz  [address] [facility] [address]     Dear Colleague,    Thank you for referring your patient, Karyn Ortiz, to the [facility] PHYSICAL MEDICINE AND REHABILITATION at [facility]. Please see a copy of my visit note below.    No adverse events.  RMT=48 at contralesional M1.  Treatment intensity = 48.  Received 10 minutes of 6 Hz priming rTMS then 10 minutes of 1 Hz principal rTMS.  Then variety of exercises.  She succeeded in pulling grapes from vine and getting to mouth one at a time.  Difficult but successful.  Practiced picking up half walnuts.  Easier than walnut pieces.  Succeeded at eating applesauce with a spoon.  Progressing slowly.        Again, thank you for allowing me to participate in the care of your patient.      Sincerely,    Beck Villalobos, PT

## 2017-06-15 NOTE — PROGRESS NOTES
No adverse events.  RMT=48 at contralesional M1.  Treatment intensity = 48.  Received 10 minutes of 6 Hz priming rTMS then 10 minutes of 1 Hz principal rTMS.  Then variety of exercises.  She succeeded in pulling grapes from vine and getting to mouth one at a time.  Difficult but successful.  Practiced picking up half walnuts.  Easier than walnut pieces.  Succeeded at eating applesauce with a spoon.  Progressing slowly.

## 2017-06-15 NOTE — MR AVS SNAPSHOT
After Visit Summary   6/15/2017    Karyn Ortiz    MRN: [id number]           Patient Information     Date Of Birth          1955        Visit Information        Provider Department      6/15/2017 8:30 AM Beck Villalobos PT [facility] Physical Medicine and Rehabilitation        Today's Diagnoses     Cerebrovascular accident (CVA) due to other mechanism (H)    -  1       Follow-ups after your visit        Your next 10 appointments already scheduled     2017  8:30 AM CDT   (Arrive by 8:15 AM)   Return Visit with CIELO Hoyt [facility] Physical Medicine and Rehabilitation ([facility] [place])    [address] [address] [phone]              Who to contact     Please call your clinic at [phone] to:    Ask questions about your health    Make or cancel appointments    Discuss your medicines    Learn about your test results    Speak to your doctor   If you have compliments or concerns about an experience at your clinic, or if you wish to file a complaint, please contact [facility] Patient Relations at [phone] or email us at [email or web address]         Additional Information About Your Visit        MyChart Information     Enhatcht is an electronic gateway that provides easy, online access to your medical records. With Planet Daily, you can request a clinic appointment, read your test results, renew a prescription or communicate with your care team.     To sign up for Enhatcht visit the website at [email or web address]   You will be asked to enter the access code listed below, as well as some personal information. Please follow the directions to create your username and password.     Your access code is: [email or web address]  Expires: 2017  4:13 PM     Your access code will  in 90 days. If you need help or a new code, please contact your [facility]  "or call [phone] for assistance.        Care EveryWhere ID     This is your Care EveryWhere ID. This could be used by other organizations to access your [place] medical records  [id number]        Your Vitals Were     Pulse Height BMI (Body Mass Index)             71 1.626 m (5' 4\") 20.81 kg/m2          Blood Pressure from Last 3 Encounters:   06/15/17 127/76   06/14/17 137/77   06/13/17 136/77    Weight from Last 3 Encounters:   06/15/17 55 kg (121 lb 4.1 oz)   06/13/17 55 kg (121 lb 4.1 oz)   06/12/17 55 kg (121 lb 4.1 oz)              Today, you had the following     No orders found for display       Primary Care Provider Office Phone # Fax #    Gustavo Gallo [phone]       [address]  [place] [address]        Thank you!     Thank you for choosing [facility] PHYSICAL MEDICINE AND REHABILITATION  for your care. Our goal is always to provide you with excellent care. Hearing back from our patients is one way we can continue to improve our services. Please take a few minutes to complete the written survey that you may receive in the mail after your visit with us. Thank you!             Your Updated Medication List - Protect others around you: Learn how to safely use, store and throw away your medicines at www.disposemymeds.org.          This list is accurate as of: 6/15/17 11:53 AM.  Always use your most recent med list.                   Brand Name Dispense Instructions for use    acetaminophen 325 MG tablet    TYLENOL     Take 650 mg by mouth every 6 hours as needed       albuterol 108 (90 BASE) MCG/ACT Inhaler    PROAIR HFA/PROVENTIL HFA/VENTOLIN HFA         atorvastatin 40 MG tablet    LIPITOR     Take 40 mg by mouth daily       clopidogrel 75 MG tablet    PLAVIX     Take 75 mg by mouth every morning         "

## 2017-06-16 ENCOUNTER — OFFICE VISIT (OUTPATIENT)
Dept: PHYSICAL MEDICINE AND REHAB | Facility: CLINIC | Age: 62
End: 2017-06-16

## 2017-06-16 VITALS
WEIGHT: 121.25 LBS | HEIGHT: 64 IN | DIASTOLIC BLOOD PRESSURE: 77 MMHG | SYSTOLIC BLOOD PRESSURE: 125 MMHG | HEART RATE: 70 BPM | BODY MASS INDEX: 20.7 KG/M2

## 2017-06-16 DIAGNOSIS — I63.89 CEREBROVASCULAR ACCIDENT (CVA) DUE TO OTHER MECHANISM (H): Primary | ICD-10-CM

## 2017-06-16 ASSESSMENT — PAIN SCALES - GENERAL: PAINLEVEL: NO PAIN (0)

## 2017-06-16 NOTE — LETTER
"6/16/2017       RE: Karyn Ortiz  [address] [facility] [address]     Dear Colleague,    Thank you for referring your patient, Karyn Ortiz, to the [facility] PHYSICAL MEDICINE AND REHABILITATION at [facility]. Please see a copy of my visit note below.    Last visit today.  No adverse events throughout the series.  RMT for ipsilesional M1 re-assessed and it was 85 today compared to 90 a few days ago and compared to no response at all 2 weeks ago. Mean +/- SD of 10 tms pulses at 130% of RMT = 90.6 +/- 32.5 uV today.  Box and block test showed 6 blocks moved with left hand and 58 in right hand, compared to 3 and 48, respectively, 2 weeks ago.  Exercises as usual.  He is now using the pad of of the distal phalanx of her thumb and connecting with the index finger to pinch objects.  Urged her to continue with feeding, drinking, grasping/releasing, activities at home.  I believe she would benefit from botox to finger flexors using a mild dose to help her further develop individual finger flexion pinch against thumb as opposed to her gross grasp (i.e. Claw), which she still uses at times.  botox to elbow flexors and shoulder internal rotators, like before, would also help.  Her Global rating of Change score was +5 (\"a good deal better\").    Again, thank you for allowing me to participate in the care of your patient.      Sincerely,    Beck Villalobos, PT      "

## 2017-06-16 NOTE — PROGRESS NOTES
"Last visit today.  No adverse events throughout the series.  RMT for ipsilesional M1 re-assessed and it was 85 today compared to 90 a few days ago and compared to no response at all 2 weeks ago. Mean +/- SD of 10 tms pulses at 130% of RMT = 90.6 +/- 32.5 uV today.  Box and block test showed 6 blocks moved with left hand and 58 in right hand, compared to 3 and 48, respectively, 2 weeks ago.  Exercises as usual.  He is now using the pad of of the distal phalanx of her thumb and connecting with the index finger to pinch objects.  Urged her to continue with feeding, drinking, grasping/releasing, activities at home.  I believe she would benefit from botox to finger flexors using a mild dose to help her further develop individual finger flexion pinch against thumb as opposed to her gross grasp (i.e. Claw), which she still uses at times.  botox to elbow flexors and shoulder internal rotators, like before, would also help.  Her Global rating of Change score was +5 (\"a good deal better\").  "

## 2017-06-16 NOTE — MR AVS SNAPSHOT
"              After Visit Summary   2017    Karyn Ortiz    MRN: [id number]           Patient Information     Date Of Birth          1955        Visit Information        Provider Department      2017 8:30 AM Beck Villalobos PT [facility] Physical Medicine and Rehabilitation        Today's Diagnoses     Cerebrovascular accident (CVA) due to other mechanism (H)    -  1       Follow-ups after your visit        Who to contact     Please call your clinic at [phone] to:    Ask questions about your health    Make or cancel appointments    Discuss your medicines    Learn about your test results    Speak to your doctor   If you have compliments or concerns about an experience at your clinic, or if you wish to file a complaint, please contact [facility] Patient Relations at [phone] or email us at [email or web address]         Additional Information About Your Visit        MyChart Information     Business Insider is an electronic gateway that provides easy, online access to your medical records. With Business Insider, you can request a clinic appointment, read your test results, renew a prescription or communicate with your care team.     To sign up for [employer]ara Media Holding[employer] visit the website at [email or web address]   You will be asked to enter the access code listed below, as well as some personal information. Please follow the directions to create your username and password.     Your access code is: [email or web address]  Expires: 2017  4:13 PM     Your access code will  in 90 days. If you need help or a new code, please contact your [facility] or call [phone] for assistance.        Care EveryWhere ID     This is your Care EveryWhere ID. This could be used by other organizations to access your [place] medical records  [id number]        Your Vitals Were     Pulse Height BMI (Body Mass Index)             70 1.626 m (5' 4\") 20.81 kg/m2       "    Blood Pressure from Last 3 Encounters:   06/16/17 125/77   06/15/17 127/76   06/14/17 137/77    Weight from Last 3 Encounters:   06/16/17 55 kg (121 lb 4.1 oz)   06/15/17 55 kg (121 lb 4.1 oz)   06/13/17 55 kg (121 lb 4.1 oz)              Today, you had the following     No orders found for display       Primary Care Provider Office Phone # Fax #    Gustavo Gallo [phone]       [address]  [facility] [address]        Thank you!     Thank you for choosing [facility] PHYSICAL MEDICINE AND REHABILITATION  for your care. Our goal is always to provide you with excellent care. Hearing back from our patients is one way we can continue to improve our services. Please take a few minutes to complete the written survey that you may receive in the mail after your visit with us. Thank you!             Your Updated Medication List - Protect others around you: Learn how to safely use, store and throw away your medicines at www.disposemymeds.org.          This list is accurate as of: 6/16/17 11:59 AM.  Always use your most recent med list.                   Brand Name Dispense Instructions for use    acetaminophen 325 MG tablet    TYLENOL     Take 650 mg by mouth every 6 hours as needed       albuterol 108 (90 BASE) MCG/ACT Inhaler    PROAIR HFA/PROVENTIL HFA/VENTOLIN HFA         atorvastatin 40 MG tablet    LIPITOR     Take 40 mg by mouth daily       clopidogrel 75 MG tablet    PLAVIX     Take 75 mg by mouth every morning

## 2017-06-24 ENCOUNTER — HEALTH MAINTENANCE LETTER (OUTPATIENT)
Age: 62
End: 2017-06-24

## 2019-01-25 NOTE — PROGRESS NOTES
Karyn is a 63 year old No obstetric history on file. female who presents for annual exam.     Besides routine health maintenance, she has no other health concerns today .    HPI:  The patient's PCP is Gustavo Gallo MD.    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 Patient is postmenopausal.  Her current contraception method is: menopause.  She  reports that  has never smoked. she has never used smokeless tobacco.    Patient is sexually active.  STD testing offered?  Declined  Last PHQ-9 score on record = No flowsheet data found.  Last GAD7 score on record = No flowsheet data found.  Alcohol Score = 1    HEALTH MAINTENANCE:  Cholesterol: 12/22/16   Total= 174, Triglycerides=28, HDL=85, LDL=83   Last Mammo: 1/28/19/Today, @ [facility], per patient Next Mammo:  Next year  Pap: 11/13/12 - WNIL/ HPV: Negative  Colonoscopy:  11/22/10, Result: normal, Next Colonoscopy: 2020.  Dexa:  never    Health maintenance updated:  yes    HISTORY:  Obstetric History     No data available          There is no problem list on file for this patient.    History reviewed. No pertinent surgical history.   Social History     Tobacco Use     Smoking status: Never Smoker     Smokeless tobacco: Never Used   Substance Use Topics     Alcohol use: Yes           Current Outpatient Medications   Medication Sig     acetaminophen (TYLENOL) 325 MG tablet Take 650 mg by mouth every 6 hours as needed     albuterol (PROAIR HFA/PROVENTIL HFA/VENTOLIN HFA) 108 (90 BASE) MCG/ACT Inhaler      aspirin 81 MG EC tablet Take 81 mg by mouth     atorvastatin (LIPITOR) 40 MG tablet Take 40 mg by mouth daily     baclofen (LIORESAL) 10 MG tablet Take 15 mg by mouth     clopidogrel (PLAVIX) 75 MG tablet Take 75 mg by mouth every morning     gabapentin  "(NEURONTIN) 300 MG capsule      losartan (COZAAR) 25 MG tablet Take 25 mg by mouth     rosuvastatin (CRESTOR) 40 MG tablet Take 40 mg by mouth     No current facility-administered medications for this visit.      Allergies   Allergen Reactions     Simvastatin Muscle Pain (Myalgia)     Other reaction(s): Myalgia       Past medical, surgical, social and family histories were reviewed and updated in EPIC.    ROS:   12 point review of systems negative other than symptoms noted below.  Constitutional: Weight Loss  Neurologic: Dizziness  Psychiatric: Anxiety    EXAM:  /74 (BP Location: Right arm, Patient Position: Sitting, Cuff Size: Adult Regular)   Pulse 76   Ht 1.626 m (5' 4\")   Wt 52.6 kg (116 lb)   Breastfeeding? No   BMI 19.91 kg/m     BMI: Body mass index is 19.91 kg/m .    PHYSICAL EXAM:  Constitutional:  Appearance: Well nourished, well developed, alert, in no acute distress  Neck:  Lymph Nodes:  No lymphadenopathy present    Thyroid:  Gland size normal, nontender, no nodules or masses present  on palpation  Chest:  Respiratory Effort:  Breathing unlabored  Cardiovascular:    Heart: Auscultation:  Regular rate, normal rhythm, no murmurs present  Breasts: Inspection of Breasts:  No lymphadenopathy present., Palpation of Breasts and Axillae:  No masses present on palpation, no breast tenderness., Axillary Lymph Nodes:  No lymphadenopathy present. and No nodularity, asymmetry or nipple discharge bilaterally.  Gastrointestinal:   Abdominal Examination:  Abdomen nontender to palpation, tone normal without rigidity or guarding, no masses present, umbilicus without lesions   Liver and Spleen:  No hepatomegaly present, liver nontender to palpation    Hernias:  No hernias present  Lymphatic: Lymph Nodes:  No other lymphadenopathy present  Skin:  General Inspection:  No rashes present, no lesions present, no areas of  discoloration    Genitalia and Groin:  No rashes present, no lesions present, no areas of "  discoloration, no masses present  Neurologic/Psychiatric:    Mental Status:  Oriented X3     Pelvic Exam:  External Genitalia:     Normal appearance for age, no discharge present, no tenderness present, no inflammatory lesions present, color normal  Vagina:     Normal vaginal vault without central or paravaginal defects, no discharge present, no inflammatory lesions present, no masses present  Bladder:     Nontender to palpation  Urethra:   Urethral Body:  Urethra palpation normal, urethra structural support normal   Urethral Meatus:  No erythema or lesions present  Cervix:     Appearance healthy, no lesions present, nontender to palpation, no bleeding present  Uterus:     Uterus: firm, normal sized and nontender, midplane in position.   Adnexa:     No adnexal tenderness present, no adnexal masses present  Perineum:     Perineum within normal limits, no evidence of trauma, no rashes or skin lesions present  Anus:     Anus within normal limits, no hemorrhoids present  Inguinal Lymph Nodes:     No lymphadenopathy present  Pubic Hair:     Normal pubic hair distribution for age  Genitalia and Groin:     No rashes present, no lesions present, no areas of discoloration, no masses present      COUNSELING:   Reviewed preventive health counseling, as reflected in patient instructions       Regular exercise       Healthy diet/nutrition    BMI: Body mass index is 19.91 kg/m .      ASSESSMENT:  63 year old female with satisfactory annual exam.    ICD-10-CM    1. Encounter for gynecological examination without abnormal finding Z01.419        PLAN: Pap and hpv done      Mariel Aleman MD

## 2019-01-28 ENCOUNTER — TRANSFERRED RECORDS (OUTPATIENT)
Dept: HEALTH INFORMATION MANAGEMENT | Facility: CLINIC | Age: 64
End: 2019-01-28

## 2019-01-28 ENCOUNTER — OFFICE VISIT (OUTPATIENT)
Dept: OBGYN | Facility: CLINIC | Age: 64
End: 2019-01-28
Payer: COMMERCIAL

## 2019-01-28 VITALS
HEIGHT: 64 IN | DIASTOLIC BLOOD PRESSURE: 74 MMHG | BODY MASS INDEX: 19.81 KG/M2 | HEART RATE: 76 BPM | SYSTOLIC BLOOD PRESSURE: 110 MMHG | WEIGHT: 116 LBS

## 2019-01-28 DIAGNOSIS — Z01.419 ENCOUNTER FOR GYNECOLOGICAL EXAMINATION WITHOUT ABNORMAL FINDING: Primary | ICD-10-CM

## 2019-01-28 PROCEDURE — G0145 SCR C/V CYTO,THINLAYER,RESCR: HCPCS | Performed by: OBSTETRICS & GYNECOLOGY

## 2019-01-28 PROCEDURE — 87624 HPV HI-RISK TYP POOLED RSLT: CPT | Performed by: OBSTETRICS & GYNECOLOGY

## 2019-01-28 PROCEDURE — 99386 PREV VISIT NEW AGE 40-64: CPT | Performed by: OBSTETRICS & GYNECOLOGY

## 2019-01-28 RX ORDER — ROSUVASTATIN CALCIUM 40 MG/1
40 TABLET, COATED ORAL
COMMUNITY
Start: 2019-01-23

## 2019-01-28 RX ORDER — BACLOFEN 10 MG/1
15 TABLET ORAL
COMMUNITY
Start: 2018-11-08

## 2019-01-28 RX ORDER — LOSARTAN POTASSIUM 25 MG/1
25 TABLET ORAL
COMMUNITY
Start: 2019-01-23

## 2019-01-28 RX ORDER — GABAPENTIN 300 MG/1
CAPSULE ORAL
COMMUNITY
Start: 2018-06-05

## 2019-01-28 RX ORDER — ASPIRIN 81 MG/1
81 TABLET ORAL
COMMUNITY
Start: 2017-10-03

## 2019-01-28 ASSESSMENT — ANXIETY QUESTIONNAIRES
3. WORRYING TOO MUCH ABOUT DIFFERENT THINGS: SEVERAL DAYS
GAD7 TOTAL SCORE: 5
6. BECOMING EASILY ANNOYED OR IRRITABLE: NOT AT ALL
1. FEELING NERVOUS, ANXIOUS, OR ON EDGE: SEVERAL DAYS
2. NOT BEING ABLE TO STOP OR CONTROL WORRYING: SEVERAL DAYS
IF YOU CHECKED OFF ANY PROBLEMS ON THIS QUESTIONNAIRE, HOW DIFFICULT HAVE THESE PROBLEMS MADE IT FOR YOU TO DO YOUR WORK, TAKE CARE OF THINGS AT HOME, OR GET ALONG WITH OTHER PEOPLE: NOT DIFFICULT AT ALL
5. BEING SO RESTLESS THAT IT IS HARD TO SIT STILL: NOT AT ALL
7. FEELING AFRAID AS IF SOMETHING AWFUL MIGHT HAPPEN: SEVERAL DAYS

## 2019-01-28 ASSESSMENT — PATIENT HEALTH QUESTIONNAIRE - PHQ9
SUM OF ALL RESPONSES TO PHQ QUESTIONS 1-9: 4
5. POOR APPETITE OR OVEREATING: SEVERAL DAYS

## 2019-01-28 ASSESSMENT — MIFFLIN-ST. JEOR: SCORE: 1066.17

## 2019-01-29 ASSESSMENT — ANXIETY QUESTIONNAIRES: GAD7 TOTAL SCORE: 5

## 2019-01-31 LAB
COPATH REPORT: NORMAL
PAP: NORMAL

## 2019-02-01 LAB
FINAL DIAGNOSIS: NORMAL
HPV HR 12 DNA CVX QL NAA+PROBE: NEGATIVE
HPV16 DNA SPEC QL NAA+PROBE: NEGATIVE
HPV18 DNA SPEC QL NAA+PROBE: NEGATIVE
SPECIMEN DESCRIPTION: NORMAL
SPECIMEN SOURCE CVX/VAG CYTO: NORMAL

## 2019-05-06 NOTE — NURSING NOTE
Chief Complaint   Patient presents with     RECHECK     RTMS     Nupur Coelho MA     Per kristen, PA was approved. Called pharmacy to let them know that PA was approved.     Spoke with patient and informed her of approval.

## 2019-10-02 ENCOUNTER — HEALTH MAINTENANCE LETTER (OUTPATIENT)
Age: 64
End: 2019-10-02

## 2020-03-22 ENCOUNTER — HEALTH MAINTENANCE LETTER (OUTPATIENT)
Age: 65
End: 2020-03-22

## 2020-03-24 ENCOUNTER — TRANSFERRED RECORDS (OUTPATIENT)
Dept: HEALTH INFORMATION MANAGEMENT | Facility: CLINIC | Age: 65
End: 2020-03-24

## 2020-12-12 ENCOUNTER — TRANSFERRED RECORDS (OUTPATIENT)
Dept: HEALTH INFORMATION MANAGEMENT | Facility: CLINIC | Age: 65
End: 2020-12-12

## 2021-01-15 ENCOUNTER — HEALTH MAINTENANCE LETTER (OUTPATIENT)
Age: 66
End: 2021-01-15

## 2021-09-05 ENCOUNTER — HEALTH MAINTENANCE LETTER (OUTPATIENT)
Age: 66
End: 2021-09-05

## 2021-11-08 ENCOUNTER — TRANSFERRED RECORDS (OUTPATIENT)
Dept: HEALTH INFORMATION MANAGEMENT | Facility: CLINIC | Age: 66
End: 2021-11-08
Payer: COMMERCIAL

## 2022-02-19 ENCOUNTER — HEALTH MAINTENANCE LETTER (OUTPATIENT)
Age: 67
End: 2022-02-19

## 2022-10-22 ENCOUNTER — HEALTH MAINTENANCE LETTER (OUTPATIENT)
Age: 67
End: 2022-10-22

## 2023-01-11 ENCOUNTER — TRANSFERRED RECORDS (OUTPATIENT)
Dept: HEALTH INFORMATION MANAGEMENT | Facility: CLINIC | Age: 68
End: 2023-01-11
Payer: COMMERCIAL

## 2023-04-01 ENCOUNTER — HEALTH MAINTENANCE LETTER (OUTPATIENT)
Age: 68
End: 2023-04-01

## 2024-04-02 ENCOUNTER — TRANSFERRED RECORDS (OUTPATIENT)
Dept: HEALTH INFORMATION MANAGEMENT | Facility: CLINIC | Age: 69
End: 2024-04-02
Payer: COMMERCIAL

## 2025-04-01 ENCOUNTER — TRANSFERRED RECORDS (OUTPATIENT)
Dept: HEALTH INFORMATION MANAGEMENT | Facility: CLINIC | Age: 70
End: 2025-04-01
Payer: COMMERCIAL